# Patient Record
Sex: MALE | Race: WHITE
[De-identification: names, ages, dates, MRNs, and addresses within clinical notes are randomized per-mention and may not be internally consistent; named-entity substitution may affect disease eponyms.]

---

## 2017-08-28 ENCOUNTER — HOSPITAL ENCOUNTER (OUTPATIENT)
Dept: HOSPITAL 17 - PHED | Age: 37
Setting detail: OBSERVATION
LOS: 1 days | Discharge: HOME | End: 2017-08-29
Attending: FAMILY MEDICINE | Admitting: FAMILY MEDICINE
Payer: COMMERCIAL

## 2017-08-28 VITALS
RESPIRATION RATE: 20 BRPM | HEART RATE: 86 BPM | SYSTOLIC BLOOD PRESSURE: 132 MMHG | TEMPERATURE: 98.6 F | DIASTOLIC BLOOD PRESSURE: 67 MMHG | OXYGEN SATURATION: 99 %

## 2017-08-28 VITALS
OXYGEN SATURATION: 97 % | SYSTOLIC BLOOD PRESSURE: 121 MMHG | DIASTOLIC BLOOD PRESSURE: 74 MMHG | RESPIRATION RATE: 18 BRPM | HEART RATE: 72 BPM

## 2017-08-28 DIAGNOSIS — E78.5: ICD-10-CM

## 2017-08-28 DIAGNOSIS — G40.909: Primary | ICD-10-CM

## 2017-08-28 DIAGNOSIS — G25.0: ICD-10-CM

## 2017-08-28 DIAGNOSIS — E87.1: ICD-10-CM

## 2017-08-28 DIAGNOSIS — F84.0: ICD-10-CM

## 2017-08-28 DIAGNOSIS — S01.81XA: ICD-10-CM

## 2017-08-28 DIAGNOSIS — Z79.899: ICD-10-CM

## 2017-08-28 LAB
ALP SERPL-CCNC: 65 U/L (ref 45–117)
ALT SERPL-CCNC: 36 U/L (ref 12–78)
ANION GAP SERPL CALC-SCNC: 8 MEQ/L (ref 5–15)
AST SERPL-CCNC: 19 U/L (ref 15–37)
BASOPHILS # BLD AUTO: 0.1 TH/MM3 (ref 0–0.2)
BASOPHILS NFR BLD: 0.4 % (ref 0–2)
BILIRUB SERPL-MCNC: 0.3 MG/DL (ref 0.2–1)
BUN SERPL-MCNC: 11 MG/DL (ref 7–18)
CHLORIDE SERPL-SCNC: 93 MEQ/L (ref 98–107)
EOSINOPHIL # BLD: 0.6 TH/MM3 (ref 0–0.4)
EOSINOPHIL NFR BLD: 4.3 % (ref 0–4)
ERYTHROCYTE [DISTWIDTH] IN BLOOD BY AUTOMATED COUNT: 12.5 % (ref 11.6–17.2)
GFR SERPLBLD BASED ON 1.73 SQ M-ARVRAT: 103 ML/MIN (ref 89–?)
HCO3 BLD-SCNC: 25.6 MEQ/L (ref 21–32)
HCT VFR BLD CALC: 43.4 % (ref 39–51)
HEMO FLAGS: (no result)
LYMPHOCYTES # BLD AUTO: 1.3 TH/MM3 (ref 1–4.8)
LYMPHOCYTES NFR BLD AUTO: 9.9 % (ref 9–44)
MCH RBC QN AUTO: 30.2 PG (ref 27–34)
MCHC RBC AUTO-ENTMCNC: 33.8 % (ref 32–36)
MCV RBC AUTO: 89.3 FL (ref 80–100)
MONOCYTES NFR BLD: 5.9 % (ref 0–8)
NEUTROPHILS # BLD AUTO: 10.4 TH/MM3 (ref 1.8–7.7)
NEUTROPHILS NFR BLD AUTO: 79.5 % (ref 16–70)
PLATELET # BLD: 315 TH/MM3 (ref 150–450)
POTASSIUM SERPL-SCNC: 4.1 MEQ/L (ref 3.5–5.1)
RBC # BLD AUTO: 4.86 MIL/MM3 (ref 4.5–5.9)
SODIUM SERPL-SCNC: 127 MEQ/L (ref 136–145)
WBC # BLD AUTO: 13.1 TH/MM3 (ref 4–11)

## 2017-08-28 PROCEDURE — 96360 HYDRATION IV INFUSION INIT: CPT

## 2017-08-28 PROCEDURE — G0378 HOSPITAL OBSERVATION PER HR: HCPCS

## 2017-08-28 PROCEDURE — 80048 BASIC METABOLIC PNL TOTAL CA: CPT

## 2017-08-28 PROCEDURE — 90714 TD VACC NO PRESV 7 YRS+ IM: CPT

## 2017-08-28 PROCEDURE — 96372 THER/PROPH/DIAG INJ SC/IM: CPT

## 2017-08-28 PROCEDURE — 72125 CT NECK SPINE W/O DYE: CPT

## 2017-08-28 PROCEDURE — 95819 EEG AWAKE AND ASLEEP: CPT

## 2017-08-28 PROCEDURE — 82550 ASSAY OF CK (CPK): CPT

## 2017-08-28 PROCEDURE — 85025 COMPLETE CBC W/AUTO DIFF WBC: CPT

## 2017-08-28 PROCEDURE — 80156 ASSAY CARBAMAZEPINE TOTAL: CPT

## 2017-08-28 PROCEDURE — 99285 EMERGENCY DEPT VISIT HI MDM: CPT

## 2017-08-28 PROCEDURE — 80183 DRUG SCRN QUANT OXCARBAZEPIN: CPT

## 2017-08-28 PROCEDURE — 80053 COMPREHEN METABOLIC PANEL: CPT

## 2017-08-28 PROCEDURE — 70450 CT HEAD/BRAIN W/O DYE: CPT

## 2017-08-28 RX ADMIN — PROPRANOLOL HYDROCHLORIDE SCH MG: 40 TABLET ORAL at 21:00

## 2017-08-28 NOTE — RADRPT
EXAM DATE/TIME:  08/28/2017 19:51 

 

[facility] COMPARISON:     

No previous studies available for comparison.

 

 

INDICATIONS :     

Trauma. Possible seizure. Fall.

                      

 

RADIATION DOSE:     

66.66 CTDIvol (mGy) 

 

 

 

MEDICAL HISTORY :     

Seizures.  

 

SURGICAL HISTORY :      

None. 

 

ENCOUNTER:      

Initial

 

ACUITY:      

1 day

 

PAIN SCALE:      

4/10

 

LOCATION:        

cranial 

 

TECHNIQUE:     

Multiple contiguous axial images were obtained of the head.  Using automated exposure control and adj
ustment of the mA and/or kV according to patient size, radiation dose was kept as low as reasonably a
chievable to obtain optimal diagnostic quality images.   DICOM format image data is available electro
nically for review and comparison.  

 

FINDINGS:     

 

CEREBRUM:     

The ventricles are normal for age.  No evidence of midline shift, mass lesion, hemorrhage or acute in
farction.  No extra-axial fluid collections are seen.

 

POSTERIOR FOSSA:     

The cerebellum and brainstem are intact.  The 4th ventricle is midline.  The cerebellopontine angle i
s unremarkable.

 

EXTRACRANIAL:     

The visualized portion of the orbits is intact. There is mucosal disease throughout the visualized si
nuses.

 

SKULL:     

The calvaria is intact.  No evidence of skull fracture.

 

CONCLUSION:     

1. No intracranial abnormality is seen.

2. Extensive mucosal sinus disease.

 

 

 

 Chris Torres MD on August 28, 2017 at 21:06           

Board Certified Radiologist.

 This report was verified electronically.

## 2017-08-28 NOTE — PD
HPI


Chief Complaint:  Neuro Symptoms/ Deficits


Time Seen by Provider:  19:23


Travel History


International Travel<30 days:  No


Contact w/Intl Traveler<30days:  No





History of Present Illness


HPI


The patient 37 years old.  He arrives by EMS.  He has a history of epilepsy, 

autism and after his parents called him to dinner a few times they went 

upstairs and found him on the floor thereafter.  Blood was observed they are as 

well as laceration on the face.  According to the father, a physician, the 

patient appeared to be "profoundly postictal."  In the ER the patient complains 

of back pain and neck pain while on the hard EMS backboard and with a plastic 

collar.  Last tetanus is unknown according to parents.  Patient takes 

Trileptal.  Location neurologic.  Location skin.  Onset sudden.  Timing 

resolved.





PFSH


Past Surgical History


Oral Surgery:  Yes (JAW SURGERY - 1999)





Social History


Alcohol Use:  No


Tobacco Use:  No


Substance Use:  No





Allergies-Medications


(Allergen,Severity, Reaction):  


Coded Allergies:  


     No Known Allergies (Verified , 8/28/17)


Reported Meds & Prescriptions





Reported Meds & Active Scripts


Active


Reported


Propranolol (Propranolol HCl) 40 Mg Tab 40 Mg PO Q12HR


Finasteride 5 Mg Tab 1 Mg PO DAILY


     Do not crush.


Oxcarbazepine 300 Mg Tab 300 Mg PO BID


Propecia (Finasteride) 1 Mg Tab 1 Mg PO DAILY








Review of Systems


Except as stated in HPI:  all other systems reviewed are Neg





Physical Exam


Narrative


GENERAL: 37-year-old male well-nourished well-developed pleasant and alert 3


SKIN: Warm and dry.  3 cm laceration linear left maxillary face.


HEAD: Atraumatic. Normocephalic. 


EYES: Pupils equal and round. No scleral icterus. No injection or drainage. 


ENT: No nasal bleeding or discharge.  Mucous membranes pink and moist.


NECK: Trachea midline. No JVD. 


CARDIOVASCULAR: Regular rate and rhythm.  


RESPIRATORY: No accessory muscle use. Clear to auscultation. Breath sounds 

equal bilaterally. 


GASTROINTESTINAL: Abdomen soft, non-tender, nondistended. Hepatic and splenic 

margins not palpable. 


MUSCULOSKELETAL: Extremities without clubbing, cyanosis, or edema. No obvious 

deformities. 


NEUROLOGICAL: Awake and alert. No obvious cranial nerve deficits.  Motor 

grossly within normal limits. Five out of 5 muscle strength in the arms and 

legs.  Normal speech.


PSYCHIATRIC: Appropriate mood and affect; insight and judgment normal.





Data


Data


Last Documented VS





Vital Signs








  Date Time  Temp Pulse Resp B/P (MAP) Pulse Ox O2 Delivery O2 Flow Rate FiO2


 


8/28/17 20:20      Room Air  


 


8/28/17 20:20   18  97   


 


8/28/17 20:20  72  121/74 (90)    





Vital signs reviewed


The temperature is 98.6


Orders





 Orders


Complete Blood Count With Diff (8/28/17 19:23)


Carbamazepine (Tegretol) (8/28/17 19:23)


Ct Brain W/O Iv Contrast(Rout) (8/28/17 )


Ecg Monitoring (8/28/17 19:23)


Iv Access Insert/Monitor (8/28/17 19:23)


Oximetry (8/28/17 19:23)


Comprehensive Metabolic Panel (8/28/17 19:23)


Sodium Chloride 0.9% Flush (Ns Flush) (8/28/17 19:30)


Ct Cerv Spine W/O Contrast (8/28/17 19:23)


Tetanus/Diphtheria Tox Adult (Tetanus/Di (8/28/17 19:30)


Trileptal (Oxycarbazapine) (8/28/17 19:28)


Lidocai-Epi 1%-1:100,000 Inj (Xylocaine- (8/28/17 20:00)


Sodium Chlor 0.9% 1000 Ml Inj (Ns 1000 M (8/28/17 20:30)


Place In Observation (8/28/17 )


Vital Signs (Adult) BISI.Q4H (8/28/17 20:48)


Activity Oob With Assistance (8/28/17 20:48)


Diet Regular Basic (8/29/17 Breakfast)


Consult Neurology (8/28/17 )


^ Seizure Precautions (8/28/17 20:48)


Basic Metabolic Panel (Bmp) (8/29/17 06:00)


Lorazepam Inj (Ativan Inj) (8/28/17 21:00)


Propranolol (Inderal) (8/28/17 21:00)


Patient Own Medication (8/29/17 09:00)


Admit Order (Ed Use Only) (8/28/17 20:57)





Labs





Laboratory Tests








Test


  8/28/17


19:42


 


White Blood Count 13.1 TH/MM3 


 


Red Blood Count 4.86 MIL/MM3 


 


Hemoglobin 14.7 GM/DL 


 


Hematocrit 43.4 % 


 


Mean Corpuscular Volume 89.3 FL 


 


Mean Corpuscular Hemoglobin 30.2 PG 


 


Mean Corpuscular Hemoglobin


Concent 33.8 % 


 


 


Red Cell Distribution Width 12.5 % 


 


Platelet Count 315 TH/MM3 


 


Mean Platelet Volume 7.1 FL 


 


Neutrophils (%) (Auto) 79.5 % 


 


Lymphocytes (%) (Auto) 9.9 % 


 


Monocytes (%) (Auto) 5.9 % 


 


Eosinophils (%) (Auto) 4.3 % 


 


Basophils (%) (Auto) 0.4 % 


 


Neutrophils # (Auto) 10.4 TH/MM3 


 


Lymphocytes # (Auto) 1.3 TH/MM3 


 


Monocytes # (Auto) 0.8 TH/MM3 


 


Eosinophils # (Auto) 0.6 TH/MM3 


 


Basophils # (Auto) 0.1 TH/MM3 


 


CBC Comment DIFF FINAL 


 


Differential Comment  


 


Blood Urea Nitrogen 11 MG/DL 


 


Creatinine 0.84 MG/DL 


 


Random Glucose 88 MG/DL 


 


Total Protein 8.1 GM/DL 


 


Albumin 4.0 GM/DL 


 


Calcium Level 8.5 MG/DL 


 


Alkaline Phosphatase 65 U/L 


 


Aspartate Amino Transf


(AST/SGOT) 19 U/L 


 


 


Alanine Aminotransferase


(ALT/SGPT) 36 U/L 


 


 


Total Bilirubin 0.3 MG/DL 


 


Sodium Level 127 MEQ/L 


 


Potassium Level 4.1 MEQ/L 


 


Chloride Level 93 MEQ/L 


 


Carbon Dioxide Level 25.6 MEQ/L 


 


Anion Gap 8 MEQ/L 


 


Estimat Glomerular Filtration


Rate 103 ML/MIN 


 


 


Carbamazepine (Tegretol) Level


  LESS THAN 0.5


MCG/ML











MDM


Medical Decision Making


Medical Screen Exam Complete:  Yes


Emergency Medical Condition:  Yes


Medical Record Reviewed:  Yes


Differential Diagnosis


Epilepsy, Breakthrough Seizure, Electrolyte Imbalance, Anemia, Subtherapeutic 

Antiepileptic, Intracranial Injury/Hemorrhage, C-Spine Injury


Narrative Course


CBC & BMP Diagram


8/28/17 19:42








Total Protein 8.1, Albumin 4.0, Calcium Level 8.5, Alkaline Phosphatase 65, 

Aspartate Amino Transf (AST/SGOT) 19, Alanine Aminotransferase (ALT/SGPT) 36, 

Total Bilirubin 0.3





Trileptal level is pending





Head CT reveals no acute disease


Cervical spine CT reveals no traumatic injury


1L NS started.


Case discussed with neurologist, Dr Obrien, who recommends 50 mg Lamictal twice 

a day starting tonight. 


Admission to [facility] floor, d/w Dr Brown for [facility], admission under Dr Jordan.





Diagnosis





 Primary Impression:  


 Seizure


 Additional Impression:  


 Hyponatremia





Admitting Information


Admitting Physician Requests:  Observation











Car Fernandez MD Aug 28, 2017 20:59

## 2017-08-28 NOTE — RADRPT
EXAM DATE/TIME:  08/28/2017 19:51 

 

[facility] COMPARISON:     

No previous studies available for comparison.

 

 

INDICATIONS :     

Trauma. Possible seizure. Fall.

                      

 

RADIATION DOSE:     

26.83 CTDIvol (mGy) 

 

 

 

MEDICAL HISTORY :     

Seizures.  

 

SURGICAL HISTORY :      

None. 

 

ENCOUNTER:      

Initial

 

ACUITY:      

1 day

 

PAIN SCALE:      

5/10

 

LOCATION:        

neck 

 

TECHNIQUE:     

Volumetric scanning of the cervical spine was performed. Multiplanar reconstructions in the sagittal,
 coronal and oblique axial planes were performed.   Using automated exposure control and adjustment o
f the mA and/or kV according to patient size, radiation dose was kept as low as reasonably achievable
 to obtain optimal diagnostic quality images.   DICOM format image data is available electronically f
or review and comparison.  

 

FINDINGS:     

 

VERTEBRAE:     

Normal vertebral body height.

 

ALIGNMENT:     

No evidence of subluxation.

 

C2-C3:  

The bony spinal canal is normal in size.  No evidence of disc bulge or herniation.  The neural forami
na are bilaterally patent.

 

C3-C4:  

The bony spinal canal is normal in size.  No evidence of disc bulge or herniation.  The neural forami
na are bilaterally patent.

 

C4-C5:  

The bony spinal canal is normal in size.  No evidence of disc bulge or herniation.  The neural forami
na are bilaterally patent.

 

C5-C6:  

The bony spinal canal is normal in size.  No evidence of disc bulge or herniation.  The neural forami
na are bilaterally patent.

 

C6-C7:  

The bony spinal canal is normal in size.  No evidence of disc bulge or herniation.  The neural forami
na are bilaterally patent.

 

C7-T1:  

The bony spinal canal is normal in size.  No evidence of disc bulge or herniation.  The neural forami
na are bilaterally patent.

 

CONCLUSION:     

Normal examination.  

 

 

 

 Chris Torres MD on August 28, 2017 at 21:07           

Board Certified Radiologist.

 This report was verified electronically.

## 2017-08-29 VITALS
SYSTOLIC BLOOD PRESSURE: 121 MMHG | HEART RATE: 94 BPM | TEMPERATURE: 96.6 F | RESPIRATION RATE: 20 BRPM | OXYGEN SATURATION: 97 % | DIASTOLIC BLOOD PRESSURE: 77 MMHG

## 2017-08-29 VITALS
HEART RATE: 70 BPM | OXYGEN SATURATION: 97 % | TEMPERATURE: 97.2 F | SYSTOLIC BLOOD PRESSURE: 120 MMHG | DIASTOLIC BLOOD PRESSURE: 78 MMHG | RESPIRATION RATE: 19 BRPM

## 2017-08-29 VITALS
HEART RATE: 75 BPM | RESPIRATION RATE: 19 BRPM | TEMPERATURE: 97.9 F | SYSTOLIC BLOOD PRESSURE: 120 MMHG | DIASTOLIC BLOOD PRESSURE: 78 MMHG | OXYGEN SATURATION: 97 %

## 2017-08-29 VITALS
SYSTOLIC BLOOD PRESSURE: 131 MMHG | TEMPERATURE: 97.9 F | RESPIRATION RATE: 19 BRPM | HEART RATE: 77 BPM | OXYGEN SATURATION: 96 % | DIASTOLIC BLOOD PRESSURE: 81 MMHG

## 2017-08-29 LAB
ANION GAP SERPL CALC-SCNC: 10 MEQ/L (ref 5–15)
BASOPHILS # BLD AUTO: 0 TH/MM3 (ref 0–0.2)
BASOPHILS NFR BLD: 0.3 % (ref 0–2)
BUN SERPL-MCNC: 8 MG/DL (ref 7–18)
CHLORIDE SERPL-SCNC: 103 MEQ/L (ref 98–107)
CK SERPL-CCNC: 223 U/L (ref 39–308)
EOSINOPHIL # BLD: 0.2 TH/MM3 (ref 0–0.4)
EOSINOPHIL NFR BLD: 3.2 % (ref 0–4)
ERYTHROCYTE [DISTWIDTH] IN BLOOD BY AUTOMATED COUNT: 12.9 % (ref 11.6–17.2)
GFR SERPLBLD BASED ON 1.73 SQ M-ARVRAT: 137 ML/MIN (ref 89–?)
HCO3 BLD-SCNC: 22.6 MEQ/L (ref 21–32)
HCT VFR BLD CALC: 41.3 % (ref 39–51)
HEMO FLAGS: (no result)
LYMPHOCYTES # BLD AUTO: 1.5 TH/MM3 (ref 1–4.8)
LYMPHOCYTES NFR BLD AUTO: 22.2 % (ref 9–44)
MCH RBC QN AUTO: 30.1 PG (ref 27–34)
MCHC RBC AUTO-ENTMCNC: 34.1 % (ref 32–36)
MCV RBC AUTO: 88.2 FL (ref 80–100)
MONOCYTES NFR BLD: 10 % (ref 0–8)
NEUTROPHILS # BLD AUTO: 4.5 TH/MM3 (ref 1.8–7.7)
NEUTROPHILS NFR BLD AUTO: 64.3 % (ref 16–70)
PLATELET # BLD: 296 TH/MM3 (ref 150–450)
POTASSIUM SERPL-SCNC: 3.6 MEQ/L (ref 3.5–5.1)
RBC # BLD AUTO: 4.69 MIL/MM3 (ref 4.5–5.9)
SODIUM SERPL-SCNC: 136 MEQ/L (ref 136–145)
WBC # BLD AUTO: 6.9 TH/MM3 (ref 4–11)

## 2017-08-29 RX ADMIN — PROPRANOLOL HYDROCHLORIDE SCH MG: 40 TABLET ORAL at 09:19

## 2017-08-29 NOTE — HHI.PR
Subjective


Remarks


Patient was admitted last night after an apparent unwitnessed seizure event.


He had been upstairs in the restroom and had not come down for dinner so his 

father went to check on him and found him on the bathroom floor with some 

bleeding from a left cheek injury.  Patient reportedly was quite confused in an 

apparent postictal state for 30-45 minutes per his father's report.  Please see 

full H&P for details.





Patient has done well overnight.  No more seizure activity or loss of 

consciousness.  Patient's mother reports patient ate a hamburger late last 

night and slept relatively well.  He did have a bowel movement overnight and 

was able to walk to the restroom with no difficulty.  They report no unusual 

activities or medication changes.  Patient has been staying hydrated per his 

mother's report.





Objective


Vitals











Vital Signs








  Date Time  Temp Pulse Resp B/P (MAP) Pulse Ox O2 Delivery O2 Flow Rate FiO2


 


8/29/17 00:00 96.6 94 20 121/77 (92) 97   


 


8/28/17 22:04 98.6 86 20 132/67 (88) 99   


 


8/28/17 20:20      Room Air  


 


8/28/17 20:20   18  97 Room Air  


 


8/28/17 20:20  72 18 121/74 (90) 97 Room Air  








GENERAL: Alert, cooperative.  No apparent distress.  He appears to be at his 

usual baseline for mentation and speech.  He recognized me immediately.


SKIN: Warm and dry.  Left cheek laceration wound with bandage in place.  

Reportedly has sutures in place.  No bleeding.  Mild erythema and early 

ecchymosis left cheek.


HEAD: Normocephalic.


EYES: No scleral icterus. No injection or drainage.  Extraocular motions 

intact.  Pupils equally round and reactive to light.


NECK: Supple, trachea midline. No JVD or lymphadenopathy.


CARDIOVASCULAR: Regular rate and rhythm without murmurs, gallops, or rubs. 


RESPIRATORY: Breath sounds equal bilaterally. No accessory muscle use.


GASTROINTESTINAL: Abdomen soft, non-tender, nondistended. 


MUSCULOSKELETAL: No cyanosis, or edema.  Moves all extremities well.  5 out of 

5 strength 4.


BACK: Nontender without obvious deformity. 


NEURO: Follows simple commands.  Moves all extremities to command.  Appeared as 

his usual baseline.


Result Diagram:  


8/28/17 1942 8/28/17 1942





Urinary Catheter:  No


Vascular Central Line Catheter:  No





A/P


Problem List:  


(1) Seizure


ICD Codes:  R56.9 - Unspecified convulsions


Status:  Acute


Plan:  Neuro has been consult.  He has history of previous seizure.  Has been 

taking his medications as directed.


Neuro recommended Lamictal 50 mg twice a day.  CT of C-spine and brain negative.





(2) Hyponatremia


ICD Codes:  E87.1 - Hypo-osmolality and hyponatremia


Status:  Acute


Plan:  Possibly associated with his anti-seizure medication.  He has tolerated 

this medication for several years without incident.


We'll defer to neurology for further recommendation of antiseizure medication.





Assessment and Plan


Hopefully discharge home later today.











Yunior Jordan MD PhD Aug 29, 2017 06:05

## 2017-08-29 NOTE — MH
cc:

NIYAH DANGELO M.D.

****

 

DATE OF ADMISSION

8/28/2017

 

DIAGNOSIS

1.  Seizure-like episode with postictal phase

2.  Hyponatremia

3.  Contusion left maxillary region

4.  Laceration left maxillary region of the face.

5.  Autism

6.  Essential tremor

7.  Chronic sinus disease

8.  History of hyperlipidemia.

 

PERTINENT HISTORY

This is a 37-year-old white male who has had prior history of single seizure

back in 2009 for which he was put on Oxcarbazepine he has been on Oxcarbazepine

since then without any subsequent seizures.  He was at home today and was not

responding to his parents calling and down stairs eat.  They subsequently went

upstairs and found him lying on the floor in his bathroom with markedly

decreased cognitive ability and unconsciousness but was able to be aroused a

little bit and he had a large contusion on the left side of his face, as well

as a laceration there was blood on the floor and on his arms.  His dad,

physician and I  noted that he was eventually able to respond to stimuli where

he would open his eyes.  Gradually over time, he became more responsive.  9-1-1

was called after we found him on the floor and an ambulance came.  He was

combative initially, but did eventually calm down in transit to the emergency

room.  He became more alert in the emergency room.  I did not actually witness

the episode where he became unconscious and it was uncertain exactly how long

he was on the floor before he was found by his mother and myself.  He is being

admitted for IV fluids due to his hyponatremia and change of his anticonvulsant

medication especially since it can lead to low sodium levels.

 

MEDICAL HISTORY

As mentioned, he had a seizure that was more a tonic seizure with no clonic

activity.  He was autis-tonic at that time with postictal phase in 2009 when

this occurred.  He was seen by neurology then and put on oxcarbazepine without

any subsequent seizure.  He has a long history of essential tremor.  He has

been diagnosed with autism at around age 2-1/2.  He has had no heart disease,

hypertension, diabetes, liver disease, kidney disease.  No colon or bowel

disease.  No history of stroke.

 

PAST SURGICAL HISTORY

1.  He had reconstructive jaw surgery involving the mandible and

    the maxilla at around age 14-15.

2.  He has had bilateral sinusotomies involving multiple sinuses

    for chronic sinus disease.

 

ALLERGIES

None

 

CURRENT MEDICATIONS

1. Propranolol 40 mg twice a day for a essential tremor.

2. Finasteride 1 mg daily for alopecia prevention.

3. Oxcarbazepine 300 mg one in the morning, two in the evening.

 

FAMILY HISTORY

His father is 62-years-of-age and has had history of DVT, pulmonary embolism

and factor five Leiden mutation.  His mother is 62 with a history of atrial

fibrillation with ablation procedure and hyperlipidemia.

 

SOCIAL HISTORY

He has never smoked.  Does not use alcohol.  He lives at home with his

parents.

 

REVIEW OF SYSTEMS

GENERAL:  He has had no recent fever, chills or sweats.

HEENT:  He had no complaint of headache.  No visual complaints.  No hearing

problems.  He does have the bruise on his for face and laceration.

CARDIOVASCULAR:  Without complaints.

PULMONARY:  No shortness of breath or cough.

GI:  No nausea, vomiting, no diarrhea, constipation, melena.

:  Without complaints.

MUSCULOSKELETAL:  Without complaints.

NEUROLOGIC:  As mentioned, he has history of essential tremor.

 

PHYSICAL EXAM

He is alert and oriented.

VITAL SIGNS:  His BP is 121/74, respirations 18, pulse 72, O2 sat 97% on room

air.

HEENT:  TMs clear.  Nose negative.  Mouth without lesions.  Pupils equal.  No

scleral icterus.  He has a bruised area on the left maxillary region with some

hematoma and a horizontal laceration that has been sutured.

NECK:  No tenderness.

HEART:  Regular rate and rhythm.  No murmur.

LUNGS:  Clear.

ABDOMEN:  Soft and nontender, no mass.

EXTREMITIES:  No edema.  Pulses palpated symmetrical.

SKIN:  Negative other than the bruising on the left cheek area and laceration.

NEUROLOGIC:  He is now oriented x3.  Motor strength is symmetrical.  Sensation

intact.  Cranial nerves intact.

 

LABORATORY

His sodium was low at 127.  The remainder of his electrolytes, BUN, creatinine

AST, ALT, alkaline phosphatase, total protein albumin are normal.

 

White count was 13.1 likely stress reaction, the hemoglobin 14.7, platelets

315,000.  Carbamazepine level less than 0.5 oxcarbazepine level is pending.

 

CT brain scan showed no acute intracranial process.  There was evidence of

chronic sinus disease.

 

CT of cervical spine showed no acute process.

 

ASSESSMENT

As noted.

 

PLAN

He is being admitted given some IV fluids of normal saline.  Neurology has been

consulted.  His seizure medicines is being changed to Lamictal.

 

 

 

 

 

                               __________________________________

                           MD ELICIA Kaur/LIEN

D:  8/28/2017/10:04 PM

T:  8/29/2017/6:29 AM

Visit #:  [id number]

Job #:  [id number]

## 2017-08-29 NOTE — MG
cc:

LINDA GUO MD

****

 

 

Lab No: [id number] Date: 8/28/17  Age: 37    Sex:  M Race:

 

DATE OF BIRTH

1980

 

RESULTS

December p.o.

 

REFERRING PHYSICIAN

Dr. Jordan

 

Awake with hyperventilation good effort.  EEG  05/08/2009 was normal with

stage II sleep. CT negative.

 

This is a  37-year-old man admitted with breakthrough seizure found on the

floor, laceration on the face, profoundly postictal, complains of back and neck

pain, history of epilepsy, autism, jaw surgery, tremors, hyperlipidemia.

 

Trileptal

Lamictal

Inderal

Tylenol

 

DESCRIPTION OF RECORD

The patient has an overall background rhythm of 8 Hz, 20-40 microvolts.  Seems

to be awake.  EKG does look sinus.  Photic stimulation does elicit a mild

posterior driving response.  Overall well-organized diffuse background.

Hyperventilation was performed towards the middle of the recording, well

preserved alpha rhythm, symmetrical background.

 

IMPRESSION

Overall normal-appearing EEGx. There is no evidence of any epileptiform

features in this one recording.  Clinical correlation.

 

                              _________________________________

                              Linda Guo MD

 

 

 

DF/

D:  8/29/2017/8:21 PM

T:  8/29/2017/8:49 PM

Visit #:  [id number]

Job #:  [id number]

## 2018-03-09 ENCOUNTER — HOSPITAL ENCOUNTER (OUTPATIENT)
Dept: HOSPITAL 17 - CLAB | Age: 38
End: 2018-03-09
Attending: FAMILY MEDICINE
Payer: COMMERCIAL

## 2018-03-09 DIAGNOSIS — E89.89: Primary | ICD-10-CM

## 2018-03-09 DIAGNOSIS — G40.209: ICD-10-CM

## 2018-03-09 DIAGNOSIS — E88.09: ICD-10-CM

## 2018-03-09 DIAGNOSIS — R78.9: ICD-10-CM

## 2018-03-09 PROCEDURE — 82140 ASSAY OF AMMONIA: CPT

## 2018-03-09 PROCEDURE — 36415 COLL VENOUS BLD VENIPUNCTURE: CPT
